# Patient Record
Sex: FEMALE | Race: BLACK OR AFRICAN AMERICAN | NOT HISPANIC OR LATINO | Employment: FULL TIME | RURAL
[De-identification: names, ages, dates, MRNs, and addresses within clinical notes are randomized per-mention and may not be internally consistent; named-entity substitution may affect disease eponyms.]

---

## 2021-11-12 ENCOUNTER — HOSPITAL ENCOUNTER (EMERGENCY)
Facility: HOSPITAL | Age: 46
Discharge: HOME | End: 2021-11-12

## 2021-11-12 VITALS
OXYGEN SATURATION: 99 % | RESPIRATION RATE: 18 BRPM | HEART RATE: 91 BPM | SYSTOLIC BLOOD PRESSURE: 189 MMHG | HEIGHT: 62 IN | BODY MASS INDEX: 35.7 KG/M2 | WEIGHT: 194 LBS | TEMPERATURE: 99 F | DIASTOLIC BLOOD PRESSURE: 107 MMHG

## 2021-11-12 DIAGNOSIS — I10 HYPERTENSION, UNSPECIFIED TYPE: Primary | ICD-10-CM

## 2021-11-12 PROCEDURE — 99283 EMERGENCY DEPT VISIT LOW MDM: CPT | Mod: ,,, | Performed by: NURSE PRACTITIONER

## 2021-11-12 PROCEDURE — 99283 PR EMERGENCY DEPT VISIT,LEVEL III: ICD-10-PCS | Mod: ,,, | Performed by: NURSE PRACTITIONER

## 2021-11-12 PROCEDURE — 99281 EMR DPT VST MAYX REQ PHY/QHP: CPT

## 2021-11-12 RX ORDER — LISINOPRIL 20 MG/1
20 TABLET ORAL DAILY
COMMUNITY
End: 2023-12-01 | Stop reason: CLARIF

## 2021-11-13 ENCOUNTER — TELEPHONE (OUTPATIENT)
Dept: EMERGENCY MEDICINE | Facility: HOSPITAL | Age: 46
End: 2021-11-13

## 2022-01-10 ENCOUNTER — HOSPITAL ENCOUNTER (EMERGENCY)
Facility: HOSPITAL | Age: 47
Discharge: HOME | End: 2022-01-11
Attending: EMERGENCY MEDICINE

## 2022-01-10 DIAGNOSIS — S12.9XXA CLOSED FRACTURE OF TRANSVERSE PROCESS OF CERVICAL VERTEBRA, INITIAL ENCOUNTER: Primary | ICD-10-CM
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
## 2022-01-10 LAB
ALBUMIN SERPL BCP-MCNC: 2.7 G/DL (ref 3.5–5)
ALBUMIN/GLOB SERPL: 0.7 {RATIO}
ALP SERPL-CCNC: 102 U/L (ref 39–100)
ALT SERPL W P-5'-P-CCNC: 24 U/L (ref 13–56)
AMPHET UR QL SCN: NEGATIVE
ANION GAP SERPL CALCULATED.3IONS-SCNC: 10 MMOL/L (ref 7–16)
APTT PPP: 35.9 SECONDS (ref 25.2–37.3)
AST SERPL W P-5'-P-CCNC: 29 U/L (ref 15–37)
BACTERIA #/AREA URNS HPF: ABNORMAL /HPF
BARBITURATES UR QL SCN: NEGATIVE
BASOPHILS # BLD AUTO: 0.03 K/UL (ref 0–0.2)
BASOPHILS NFR BLD AUTO: 0.2 % (ref 0–1)
BENZODIAZ METAB UR QL SCN: NEGATIVE
BILIRUB SERPL-MCNC: 0.3 MG/DL (ref 0–1.2)
BILIRUB UR QL STRIP: NEGATIVE
BUN SERPL-MCNC: 12 MG/DL (ref 7–18)
BUN/CREAT SERPL: 3 (ref 6–20)
CALCIUM SERPL-MCNC: 7.6 MG/DL (ref 8.5–10.1)
CANNABINOIDS UR QL SCN: NEGATIVE
CHLORIDE SERPL-SCNC: 100 MMOL/L (ref 98–107)
CLARITY UR: CLEAR
CO2 SERPL-SCNC: 32 MMOL/L (ref 21–32)
COCAINE UR QL SCN: NEGATIVE
COLOR UR: ABNORMAL
CREAT SERPL-MCNC: 3.61 MG/DL (ref 0.55–1.02)
DIFFERENTIAL METHOD BLD: ABNORMAL
EOSINOPHIL # BLD AUTO: 0.12 K/UL (ref 0–0.5)
EOSINOPHIL NFR BLD AUTO: 0.6 % (ref 1–4)
ERYTHROCYTE [DISTWIDTH] IN BLOOD BY AUTOMATED COUNT: 13.2 % (ref 11.5–14.5)
GLOBULIN SER-MCNC: 3.8 G/DL (ref 2–4)
GLUCOSE SERPL-MCNC: 103 MG/DL (ref 70–105)
GLUCOSE SERPL-MCNC: 111 MG/DL (ref 74–106)
GLUCOSE SERPL-MCNC: 122 MG/DL (ref 70–105)
GLUCOSE SERPL-MCNC: 53 MG/DL (ref 70–105)
GLUCOSE UR STRIP-MCNC: NEGATIVE MG/DL
HCT VFR BLD AUTO: 24.2 % (ref 38–47)
HGB BLD-MCNC: 8.1 G/DL (ref 12–16)
IMM GRANULOCYTES # BLD AUTO: 0.14 K/UL (ref 0–0.04)
IMM GRANULOCYTES NFR BLD: 0.8 % (ref 0–0.4)
INDIRECT COOMBS: NORMAL
INR BLD: 0.97 (ref 0.9–1.1)
KETONES UR STRIP-SCNC: NEGATIVE MG/DL
LEUKOCYTE ESTERASE UR QL STRIP: NEGATIVE
LYMPHOCYTES # BLD AUTO: 1.67 K/UL (ref 1–4.8)
LYMPHOCYTES NFR BLD AUTO: 9 % (ref 27–41)
MCH RBC QN AUTO: 28.4 PG (ref 27–31)
MCHC RBC AUTO-ENTMCNC: 33.5 G/DL (ref 32–36)
MCV RBC AUTO: 84.9 FL (ref 80–96)
MONOCYTES # BLD AUTO: 0.75 K/UL (ref 0–0.8)
MONOCYTES NFR BLD AUTO: 4 % (ref 2–6)
MPC BLD CALC-MCNC: 11 FL (ref 9.4–12.4)
MUCOUS THREADS #/AREA URNS HPF: ABNORMAL /HPF
NEUTROPHILS # BLD AUTO: 15.9 K/UL (ref 1.8–7.7)
NEUTROPHILS NFR BLD AUTO: 85.4 % (ref 53–65)
NITRITE UR QL STRIP: NEGATIVE
NRBC # BLD AUTO: 0 X10E3/UL
NRBC, AUTO (.00): 0 %
OPIATES UR QL SCN: NEGATIVE
PCP UR QL SCN: NEGATIVE
PH UR STRIP: 8 PH UNITS
PLATELET # BLD AUTO: 209 K/UL (ref 150–400)
POTASSIUM SERPL-SCNC: 2.9 MMOL/L (ref 3.5–5.1)
PROT SERPL-MCNC: 6.5 G/DL (ref 6.4–8.2)
PROT UR QL STRIP: >=300
PROTHROMBIN TIME: 12.9 SECONDS (ref 11.7–14.7)
RBC # BLD AUTO: 2.85 M/UL (ref 4.2–5.4)
RBC # UR STRIP: NEGATIVE /UL
RBC #/AREA URNS HPF: ABNORMAL /HPF
RH BLD: NORMAL
SARS-COV-2 RDRP RESP QL NAA+PROBE: NEGATIVE
SODIUM SERPL-SCNC: 139 MMOL/L (ref 136–145)
SP GR UR STRIP: 1.01
SQUAMOUS #/AREA URNS LPF: ABNORMAL /LPF
UROBILINOGEN UR STRIP-ACNC: 0.2 MG/DL
WBC # BLD AUTO: 18.61 K/UL (ref 4.5–11)
WBC #/AREA URNS HPF: ABNORMAL /HPF

## 2022-01-10 PROCEDURE — 80307 DRUG TEST PRSMV CHEM ANLYZR: CPT | Performed by: NURSE PRACTITIONER

## 2022-01-10 PROCEDURE — 82962 GLUCOSE BLOOD TEST: CPT

## 2022-01-10 PROCEDURE — 99284 EMERGENCY DEPT VISIT MOD MDM: CPT | Mod: ,,, | Performed by: NURSE PRACTITIONER

## 2022-01-10 PROCEDURE — 96375 TX/PRO/DX INJ NEW DRUG ADDON: CPT

## 2022-01-10 PROCEDURE — 63600175 PHARM REV CODE 636 W HCPCS: Performed by: NURSE PRACTITIONER

## 2022-01-10 PROCEDURE — 99284 PR EMERGENCY DEPT VISIT,LEVEL IV: ICD-10-PCS | Mod: ,,, | Performed by: NURSE PRACTITIONER

## 2022-01-10 PROCEDURE — 36415 COLL VENOUS BLD VENIPUNCTURE: CPT | Performed by: EMERGENCY MEDICINE

## 2022-01-10 PROCEDURE — 96361 HYDRATE IV INFUSION ADD-ON: CPT

## 2022-01-10 PROCEDURE — 86850 RBC ANTIBODY SCREEN: CPT | Performed by: EMERGENCY MEDICINE

## 2022-01-10 PROCEDURE — 80053 COMPREHEN METABOLIC PANEL: CPT | Performed by: NURSE PRACTITIONER

## 2022-01-10 PROCEDURE — 63600175 PHARM REV CODE 636 W HCPCS

## 2022-01-10 PROCEDURE — 81001 URINALYSIS AUTO W/SCOPE: CPT | Performed by: NURSE PRACTITIONER

## 2022-01-10 PROCEDURE — 85610 PROTHROMBIN TIME: CPT | Performed by: EMERGENCY MEDICINE

## 2022-01-10 PROCEDURE — 25000003 PHARM REV CODE 250: Performed by: NURSE PRACTITIONER

## 2022-01-10 PROCEDURE — 85025 COMPLETE CBC W/AUTO DIFF WBC: CPT | Performed by: NURSE PRACTITIONER

## 2022-01-10 PROCEDURE — S5010 5% DEXTROSE AND 0.45% SALINE: HCPCS | Performed by: NURSE PRACTITIONER

## 2022-01-10 PROCEDURE — 87635 SARS-COV-2 COVID-19 AMP PRB: CPT | Performed by: EMERGENCY MEDICINE

## 2022-01-10 PROCEDURE — 96374 THER/PROPH/DIAG INJ IV PUSH: CPT

## 2022-01-10 PROCEDURE — 99285 EMERGENCY DEPT VISIT HI MDM: CPT | Mod: 25

## 2022-01-10 PROCEDURE — 86923 COMPATIBILITY TEST ELECTRIC: CPT | Performed by: EMERGENCY MEDICINE

## 2022-01-10 PROCEDURE — 25000003 PHARM REV CODE 250

## 2022-01-10 PROCEDURE — 85730 THROMBOPLASTIN TIME PARTIAL: CPT | Performed by: EMERGENCY MEDICINE

## 2022-01-10 RX ORDER — ONDANSETRON 2 MG/ML
INJECTION INTRAMUSCULAR; INTRAVENOUS
Status: COMPLETED
Start: 2022-01-10 | End: 2022-01-10

## 2022-01-10 RX ORDER — DEXTROSE MONOHYDRATE AND SODIUM CHLORIDE 5; .45 G/100ML; G/100ML
INJECTION, SOLUTION INTRAVENOUS CONTINUOUS
Status: DISCONTINUED | OUTPATIENT
Start: 2022-01-10 | End: 2022-01-11 | Stop reason: HOSPADM

## 2022-01-10 RX ORDER — MORPHINE SULFATE 4 MG/ML
4 INJECTION, SOLUTION INTRAMUSCULAR; INTRAVENOUS
Status: COMPLETED | OUTPATIENT
Start: 2022-01-10 | End: 2022-01-10

## 2022-01-10 RX ORDER — ONDANSETRON 2 MG/ML
4 INJECTION INTRAMUSCULAR; INTRAVENOUS
Status: COMPLETED | OUTPATIENT
Start: 2022-01-10 | End: 2022-01-10

## 2022-01-10 RX ADMIN — MORPHINE SULFATE 4 MG: 4 INJECTION INTRAVENOUS at 09:01

## 2022-01-10 RX ADMIN — DEXTROSE MONOHYDRATE 25 G: 500 INJECTION PARENTERAL at 06:01

## 2022-01-10 RX ADMIN — ONDANSETRON: 2 INJECTION INTRAMUSCULAR; INTRAVENOUS at 07:01

## 2022-01-10 RX ADMIN — DEXTROSE AND SODIUM CHLORIDE: 5; 450 INJECTION, SOLUTION INTRAVENOUS at 08:01
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
## 2022-01-11 VITALS
TEMPERATURE: 99 F | HEIGHT: 62 IN | SYSTOLIC BLOOD PRESSURE: 140 MMHG | OXYGEN SATURATION: 97 % | RESPIRATION RATE: 18 BRPM | BODY MASS INDEX: 35.7 KG/M2 | HEART RATE: 82 BPM | DIASTOLIC BLOOD PRESSURE: 81 MMHG | WEIGHT: 194 LBS

## 2022-01-11 LAB
ABO + RH BLD: NORMAL
ALBUMIN SERPL BCP-MCNC: 2.4 G/DL (ref 3.5–5)
ALBUMIN/GLOB SERPL: 0.7 {RATIO}
ALP SERPL-CCNC: 85 U/L (ref 39–100)
ALT SERPL W P-5'-P-CCNC: 22 U/L (ref 13–56)
ANION GAP SERPL CALCULATED.3IONS-SCNC: 11 MMOL/L (ref 7–16)
AST SERPL W P-5'-P-CCNC: 27 U/L (ref 15–37)
BILIRUB SERPL-MCNC: 0.4 MG/DL (ref 0–1.2)
BLD PROD TYP BPU: NORMAL
BLOOD UNIT EXPIRATION DATE: NORMAL
BLOOD UNIT TYPE CODE: 5100
BUN SERPL-MCNC: 15 MG/DL (ref 7–18)
BUN/CREAT SERPL: 3 (ref 6–20)
CALCIUM SERPL-MCNC: 7.2 MG/DL (ref 8.5–10.1)
CHLORIDE SERPL-SCNC: 97 MMOL/L (ref 98–107)
CO2 SERPL-SCNC: 31 MMOL/L (ref 21–32)
CREAT SERPL-MCNC: 4.39 MG/DL (ref 0.55–1.02)
CROSSMATCH INTERPRETATION: NORMAL
DISPENSE STATUS: NORMAL
GLOBULIN SER-MCNC: 3.6 G/DL (ref 2–4)
GLUCOSE SERPL-MCNC: 111 MG/DL (ref 74–106)
GLUCOSE SERPL-MCNC: 86 MG/DL (ref 70–105)
HCT VFR BLD AUTO: 21.4 % (ref 38–47)
HCT VFR BLD AUTO: 23.6 % (ref 38–47)
HGB BLD-MCNC: 6.8 G/DL (ref 12–16)
HGB BLD-MCNC: 8 G/DL (ref 12–16)
POTASSIUM SERPL-SCNC: 3.5 MMOL/L (ref 3.5–5.1)
PROT SERPL-MCNC: 6 G/DL (ref 6.4–8.2)
SODIUM SERPL-SCNC: 135 MMOL/L (ref 136–145)
UNIT NUMBER: NORMAL

## 2022-01-11 PROCEDURE — 99285 EMERGENCY DEPT VISIT HI MDM: CPT | Mod: ,,, | Performed by: SURGERY

## 2022-01-11 PROCEDURE — 82962 GLUCOSE BLOOD TEST: CPT

## 2022-01-11 PROCEDURE — 36415 COLL VENOUS BLD VENIPUNCTURE: CPT | Performed by: EMERGENCY MEDICINE

## 2022-01-11 PROCEDURE — 25000003 PHARM REV CODE 250: Performed by: EMERGENCY MEDICINE

## 2022-01-11 PROCEDURE — 85014 HEMATOCRIT: CPT | Performed by: EMERGENCY MEDICINE

## 2022-01-11 PROCEDURE — 25500020 PHARM REV CODE 255: Performed by: EMERGENCY MEDICINE

## 2022-01-11 PROCEDURE — 99285 PR EMERGENCY DEPT VISIT,LEVEL V: ICD-10-PCS | Mod: ,,, | Performed by: SURGERY

## 2022-01-11 PROCEDURE — 36430 TRANSFUSION BLD/BLD COMPNT: CPT

## 2022-01-11 PROCEDURE — P9016 RBC LEUKOCYTES REDUCED: HCPCS | Performed by: EMERGENCY MEDICINE

## 2022-01-11 PROCEDURE — 80053 COMPREHEN METABOLIC PANEL: CPT | Performed by: EMERGENCY MEDICINE

## 2022-01-11 RX ORDER — HYDROCODONE BITARTRATE AND ACETAMINOPHEN 10; 325 MG/1; MG/1
1 TABLET ORAL EVERY 6 HOURS PRN
Qty: 16 TABLET | Refills: 0 | Status: SHIPPED | OUTPATIENT
Start: 2022-01-11 | End: 2023-12-01 | Stop reason: CLARIF

## 2022-01-11 RX ORDER — HYDROCODONE BITARTRATE AND ACETAMINOPHEN 500; 5 MG/1; MG/1
TABLET ORAL
Status: DISCONTINUED | OUTPATIENT
Start: 2022-01-11 | End: 2022-01-11 | Stop reason: HOSPADM

## 2022-01-11 RX ADMIN — IOPAMIDOL 100 ML: 755 INJECTION, SOLUTION INTRAVENOUS at 01:01

## 2022-01-11 RX ADMIN — SODIUM CHLORIDE: 9 INJECTION, SOLUTION INTRAVENOUS at 02:01

## 2022-01-11 NOTE — ED PROVIDER NOTES
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 needed for Pain. 16 tablet 1/11/2022  Ren Edwards MD        Follow-up Information    None          Kellen Gonzalez, RADHA  01/11/22 0107

## 2022-01-11 NOTE — DISCHARGE INSTRUCTIONS
Where neck brace as needed for pain.  Follow up in clinic with primary care provider in 2-3 days for recheck.  Return to emergency department for any worsening or further problems.
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
## 2023-12-01 ENCOUNTER — HOSPITAL ENCOUNTER (EMERGENCY)
Facility: HOSPITAL | Age: 48
Discharge: HOME | End: 2023-12-01
Payer: COMMERCIAL

## 2023-12-01 VITALS
DIASTOLIC BLOOD PRESSURE: 83 MMHG | WEIGHT: 193 LBS | HEART RATE: 94 BPM | TEMPERATURE: 99 F | RESPIRATION RATE: 18 BRPM | BODY MASS INDEX: 35.51 KG/M2 | OXYGEN SATURATION: 96 % | SYSTOLIC BLOOD PRESSURE: 118 MMHG | HEIGHT: 62 IN

## 2023-12-01 DIAGNOSIS — R05.9 COUGH: ICD-10-CM

## 2023-12-01 DIAGNOSIS — J40 BRONCHITIS: Primary | ICD-10-CM

## 2023-12-01 DIAGNOSIS — M94.0 COSTOCHONDRITIS: ICD-10-CM

## 2023-12-01 PROCEDURE — 99284 EMERGENCY DEPT VISIT MOD MDM: CPT | Mod: 25

## 2023-12-01 PROCEDURE — 99284 EMERGENCY DEPT VISIT MOD MDM: CPT | Mod: GF,EDII,,

## 2023-12-01 PROCEDURE — 63600175 PHARM REV CODE 636 W HCPCS

## 2023-12-01 PROCEDURE — 99284 PR EMERGENCY DEPT VISIT,LEVEL IV: ICD-10-PCS | Mod: GF,EDII,,

## 2023-12-01 PROCEDURE — 96372 THER/PROPH/DIAG INJ SC/IM: CPT

## 2023-12-01 RX ORDER — CEFTRIAXONE 1 G/1
1 INJECTION, POWDER, FOR SOLUTION INTRAMUSCULAR; INTRAVENOUS
Status: COMPLETED | OUTPATIENT
Start: 2023-12-01 | End: 2023-12-01

## 2023-12-01 RX ORDER — ORPHENADRINE CITRATE 30 MG/ML
30 INJECTION INTRAMUSCULAR; INTRAVENOUS
Status: COMPLETED | OUTPATIENT
Start: 2023-12-01 | End: 2023-12-01

## 2023-12-01 RX ORDER — CARVEDILOL 12.5 MG/1
12.5 TABLET ORAL 2 TIMES DAILY
COMMUNITY
Start: 2023-08-11

## 2023-12-01 RX ORDER — PANTOPRAZOLE SODIUM 40 MG/1
40 TABLET, DELAYED RELEASE ORAL
COMMUNITY
Start: 2023-07-13

## 2023-12-01 RX ORDER — INSULIN GLARGINE 100 [IU]/ML
40 INJECTION, SOLUTION SUBCUTANEOUS DAILY
COMMUNITY
Start: 2023-11-17

## 2023-12-01 RX ORDER — AZITHROMYCIN 250 MG/1
TABLET, FILM COATED ORAL
Qty: 6 TABLET | Refills: 0 | Status: SHIPPED | OUTPATIENT
Start: 2023-12-01 | End: 2023-12-06

## 2023-12-01 RX ADMIN — ORPHENADRINE CITRATE 30 MG: 60 INJECTION INTRAMUSCULAR; INTRAVENOUS at 05:12

## 2023-12-01 RX ADMIN — CEFTRIAXONE SODIUM 1 G: 1 INJECTION, POWDER, FOR SOLUTION INTRAMUSCULAR; INTRAVENOUS at 06:12

## 2023-12-01 NOTE — ED PROVIDER NOTES
Encounter Date: 12/1/2023       History     Chief Complaint   Patient presents with    Flank Pain     Left side pain x 2 weeks (edema)     Patient is a 48-year-old black female who presents to the emergency depart with complaints of cough x3 weeks and left-sided rib pain.  She reports that the rib pain started approximately 1 week after the cough.  She states that it is a stinging sensation and feels like it is right under her skin.  She is been treating at home with Tylenol and reports minimal relief.  She denies any known injury.  She denies any known fever.  She reports that the cough has been productive and that she is producing yellow sputum.  She denies any chest pain, shortness of breath, weakness, numbness, syncope, or any other complaints.  She does have known chronic kidney disease and is on dialysis.  She also reports a history of hypertension and diabetes.  Her blood pressure is 130/91, heart rate is 111, temperature is 99.3°, oxygen saturation 95% on room air respirations are 18.  She appears in no acute distress.    The history is provided by the patient.     Review of patient's allergies indicates:  No Known Allergies  Past Medical History:   Diagnosis Date    Chronic kidney disease     Diabetes mellitus     Hypertension      Past Surgical History:   Procedure Laterality Date    ABDOMINAL SURGERY      colon cancer    HYSTERECTOMY       History reviewed. No pertinent family history.  Social History     Tobacco Use    Smoking status: Never    Smokeless tobacco: Never   Substance Use Topics    Alcohol use: Never    Drug use: Never     Review of Systems   Constitutional:  Negative for activity change, appetite change, chills and fever.   HENT:  Positive for congestion, ear pain (left) and sore throat. Negative for ear discharge and trouble swallowing.    Respiratory:  Positive for cough. Negative for shortness of breath.    Cardiovascular:  Negative for chest pain, palpitations and leg swelling.    Gastrointestinal:  Negative for abdominal pain, diarrhea, nausea and vomiting.   Genitourinary:  Negative for dysuria and frequency.   Musculoskeletal:  Positive for myalgias. Negative for back pain, gait problem, neck pain and neck stiffness.   Skin:  Negative for color change, pallor and rash.   Neurological:  Negative for dizziness, syncope, facial asymmetry, speech difficulty, weakness, light-headedness and headaches.   Hematological:  Does not bruise/bleed easily.   Psychiatric/Behavioral:  Negative for confusion. The patient is not nervous/anxious.    All other systems reviewed and are negative.      Physical Exam     Initial Vitals [12/01/23 1654]   BP Pulse Resp Temp SpO2   (!) 130/91 (!) 111 18 99.3 °F (37.4 °C) 95 %      MAP       --         Physical Exam    Nursing note and vitals reviewed.  Constitutional: She appears well-developed and well-nourished. No distress.   HENT:   Head: Normocephalic and atraumatic.   Right Ear: Tympanic membrane and ear canal normal.   Left Ear: Ear canal normal. Tympanic membrane is injected (cloudy).   Mouth/Throat: Uvula is midline and mucous membranes are normal. Posterior oropharyngeal erythema present.   Eyes: Conjunctivae and EOM are normal. Pupils are equal, round, and reactive to light.   Neck: Neck supple.   Normal range of motion.  Cardiovascular:  Regular rhythm and normal heart sounds.   Tachycardia present.         Pulmonary/Chest: Breath sounds normal. No respiratory distress. She has no wheezes. She has no rhonchi. She has no rales. She exhibits tenderness (tender over the left rib).   Abdominal: Abdomen is soft. Bowel sounds are normal. She exhibits no distension. There is no abdominal tenderness. There is no rebound and no guarding.   Musculoskeletal:         General: Normal range of motion.      Cervical back: Normal range of motion and neck supple.     Lymphadenopathy:     She has cervical adenopathy.   Neurological: She is alert and oriented to person,  place, and time. She has normal strength. GCS score is 15. GCS eye subscore is 4. GCS verbal subscore is 5. GCS motor subscore is 6.   Skin: Skin is warm and dry. Capillary refill takes less than 2 seconds.   Psychiatric: She has a normal mood and affect. Her behavior is normal. Judgment and thought content normal.         Medical Screening Exam   See Full Note    ED Course   Procedures  Labs Reviewed - No data to display       Imaging Results              X-Ray Chest PA And Lateral (Final result)  Result time 12/01/23 18:07:34      Final result by Panchito Stephen DO (12/01/23 18:07:34)                   Impression:      No acute cardiopulmonary process demonstrated.    Point of Service: [facility]      Electronically signed by: Panchito Stephen  Date:    12/01/2023  Time:    18:07               Narrative:    EXAMINATION:  XR CHEST PA AND LATERAL    CLINICAL HISTORY:  Cough, unspecified    COMPARISON:  Chest x-ray January 10, 2022    TECHNIQUE:  Frontal and lateral views of the chest.    FINDINGS:  Heart size appears within normal limits.  No focal consolidation, pleural effusion, or pneumothorax.  Visualized osseous and surrounding soft tissue structures demonstrate no acute abnormality.                                       Medications   cefTRIAXone injection 1 g (has no administration in time range)   orphenadrine injection 30 mg (30 mg Intramuscular Given 12/1/23 1725)     Medical Decision Making  Patient presents for left-sided rib pain and cough evaluation.  She denies any injury.  She does report a productive cough and is receiving yellow sputum.  She is afebrile.  She denies any shortness of breath.  She is been treating with Tylenol with minimal relief.  She is currently on dialysis.  She reports that she was seen earlier today at [facility] and received a chest x-ray.  She reports they do not tell her the results and told someone would call her Monday.  She also reports  that she did not receive any clear discharge instructions.  We will perform a chest x-ray for further evaluation.  Due to the patient's chronic kidney condition we will not provide any Toradol or steroids at this time.  This was explained to the patient.  She was advised to discuss the possibility of these treatment options with her nephrologist and follow his instructions accordingly.  However, we will treat the patient with Norflex 30 mg IM while in the emergency department for pain control.    18:30 in to re-evaluate.  Patient reports mild improvement after Norflex IM.  Chest x-ray was noted to show no acute cardiopulmonary process.  We will still treat the patient's three-week history of cough has a bronchitis.  She reports that it is not improving.  We will dose her with Rocephin 1 g IM in the emergency department and follow with an outpatient prescription for azithromycin.  She was encouraged to continue Tylenol over-the-counter as directed.  She was instructed to consult with her nephrologist about their comfort level with NSAID therapy.  She was advised to withhold any NSAIDs unless instructed otherwise by them.  She was also instructed to brace the area of discomfort when coughing, deep breathing, sneezing, or any other anticipated discomfort.  She was advised to follow up with her regular doctor on Monday for a recheck.  She was also advised to return to the emergency department for any new or worrisome symptoms.  She verbalizes understanding and is in agreement with this plan.    Amount and/or Complexity of Data Reviewed  Independent Historian:      Details: Patient is a 48-year-old black female who presents to the emergency depart with complaints of cough x3 weeks and left-sided rib pain.  She reports that the rib pain started approximately 1 week after the cough.  She states that it is a stinging sensation and feels like it is right under her skin.  She is been treating at home with Tylenol and reports  minimal relief.  She denies any known injury.  She denies any known fever.  She reports that the cough has been productive and that she is producing yellow sputum.  She denies any chest pain, shortness of breath, weakness, numbness, syncope, or any other complaints.  She does have known chronic kidney disease and is on dialysis.  She also reports a history of hypertension and diabetes.  Her blood pressure is 130/91, heart rate is 111, temperature is 99.3°, oxygen saturation 95% on room air respirations are 18.  She appears in no acute distress.  Radiology: ordered.     Details: Chest x-ray shows no acute cardiopulmonary process.    Risk  Prescription drug management.  Risk Details: The presentation of Umu Caputo is consistent with acute bronchitis. More serious diseases of the lower respiratory tract were considered but in the absence of clinical or ancillary findings highly suggestive of such, these conditions were considered unlikely. Such diseases include but are not limited to pneumonia, asthma (extrinsic or intrinsic), influenza, retained foreign body, or occupational exposures. Other causes of cough such as GERD, pharyngitis, sinusitis, or COPD were also felt to be unlikely.    Upon discharge, Umu Caputo has no evidence of respiratory failure and is comfortable without respiratory distress. Additionally, Umu Caputo has no evidence of airway compromise and is speaking in full/complete sentences without difficulty. Umu Caputo meets outpatient treatment criteria.    Data Reviewed/Counseling: I have reviewed the patient's vital signs, nursing notes, and other relevant ancillary testing/information. I have had a detailed discussion with the patient regarding the historical points, examination findings, and any diagnostic results. I have also discussed the need for outpatient follow-up. I have recommended symptomatic therapy directed at alleviating symptoms as care for acute bronchitis is primarily  supportive.  The patient's cough has been present for 3 weeks and she also has comorbidities such as chronic kidney disease, hypertension, and diabetes so we will treat the patient with antibiotic therapy.  See MDM above for further details.    Umu Caputo has been counseled to return to the Emergency Department if symptoms worsen or if there are any questions or concerns that arise while at home.    Umu Caputo encouraged to practice good infection control procedures to include but not limited to frequent hand washing to lessen likelihood of transmission of this infection.   Final diagnoses:  [R05.9] Cough  [J40] Bronchitis (Primary)  [M94.0] Costochondritis                                      Clinical Impression:   Final diagnoses:  [R05.9] Cough  [J40] Bronchitis (Primary)  [M94.0] Costochondritis        ED Disposition Condition    Discharge Stable          ED Prescriptions       Medication Sig Dispense Start Date End Date Auth. Provider    azithromycin (Z-MAGGIE) 250 MG tablet Take 2 tablets by mouth on day 1; Take 1 tablet by mouth on days 2-5 6 tablet 12/1/2023 12/6/2023 Saad Alcantar FNP          Follow-up Information       Follow up With Specialties Details Why Contact Info    Sandrine Sesay MD Family Medicine Go on 12/4/2023  [address]  [facility] [place] [address]  [phone]               Saad Alcantar FNP  12/01/23 8874

## 2023-12-02 NOTE — DISCHARGE INSTRUCTIONS
Splint affected area during coughing, sneezing, and deep breathing as discussed.  Take antibiotics as prescribed.  Complete all antibiotics even if you feel better.  Please contact your nephrologist for there outpatient recommendations on anti-inflammatories and cough medications.  Follow up with your primary care doctor on Monday as planned.  Return to the emergency department for any new or worrisome symptoms.

## 2023-12-06 NOTE — ADDENDUM NOTE
Encounter addended by: Devika Márquez on: 12/6/2023 9:55 AM   Actions taken: Charge Capture section accepted

## 2024-09-09 ENCOUNTER — HOSPITAL ENCOUNTER (EMERGENCY)
Facility: HOSPITAL | Age: 49
Discharge: HOME | End: 2024-09-09
Payer: COMMERCIAL

## 2024-09-09 VITALS
TEMPERATURE: 98 F | DIASTOLIC BLOOD PRESSURE: 89 MMHG | HEIGHT: 62 IN | BODY MASS INDEX: 32.39 KG/M2 | WEIGHT: 176 LBS | HEART RATE: 77 BPM | SYSTOLIC BLOOD PRESSURE: 143 MMHG | RESPIRATION RATE: 16 BRPM | OXYGEN SATURATION: 99 %

## 2024-09-09 DIAGNOSIS — M25.561 KNEE PAIN, RIGHT: ICD-10-CM

## 2024-09-09 DIAGNOSIS — M25.461 EFFUSION OF RIGHT KNEE JOINT: ICD-10-CM

## 2024-09-09 DIAGNOSIS — S89.91XA RIGHT KNEE INJURY, INITIAL ENCOUNTER: Primary | ICD-10-CM
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
## 2024-09-09 DIAGNOSIS — S86.911A KNEE STRAIN, RIGHT, INITIAL ENCOUNTER: ICD-10-CM

## 2024-09-09 PROCEDURE — 99284 EMERGENCY DEPT VISIT MOD MDM: CPT | Mod: EDII,,,

## 2024-09-09 PROCEDURE — 25000003 PHARM REV CODE 250

## 2024-09-09 PROCEDURE — 99283 EMERGENCY DEPT VISIT LOW MDM: CPT | Mod: 25

## 2024-09-09 PROCEDURE — 29505 APPLICATION LONG LEG SPLINT: CPT | Mod: RT

## 2024-09-09 RX ORDER — LEVOTHYROXINE SODIUM 50 UG/1
50 TABLET ORAL
COMMUNITY
Start: 2024-09-06

## 2024-09-09 RX ORDER — HYDROCODONE BITARTRATE AND ACETAMINOPHEN 5; 325 MG/1; MG/1
1 TABLET ORAL EVERY 6 HOURS PRN
Qty: 12 TABLET | Refills: 0 | Status: SHIPPED | OUTPATIENT
Start: 2024-09-09

## 2024-09-09 RX ORDER — SEVELAMER CARBONATE 800 MG/1
TABLET, FILM COATED ORAL
COMMUNITY
Start: 2024-07-02

## 2024-09-09 RX ORDER — HYDROCODONE BITARTRATE AND ACETAMINOPHEN 5; 325 MG/1; MG/1
1 TABLET ORAL
Status: COMPLETED | OUTPATIENT
Start: 2024-09-09 | End: 2024-09-09

## 2024-09-09 RX ADMIN — HYDROCODONE BITARTRATE AND ACETAMINOPHEN 1 TABLET: 5; 325 TABLET ORAL at 11:09

## 2024-09-09 NOTE — DISCHARGE INSTRUCTIONS
Take hydrocodone as prescribed for pain control.  Use knee immobilizer and crutches as directed until instructed otherwise.  Rest, ice, compress, and elevate your right lower extremity as directed.  We have placed a outpatient referral for orthopedic follow up and they should call you with the appointment date and time when available.  Follow up with the primary care provider in 1-2 days for a recheck.  Return to emerge department for any new or worrisome symptoms.

## 2024-09-09 NOTE — ED PROVIDER NOTES
Encounter Date: 9/9/2024       History     Chief Complaint   Patient presents with    Knee Injury     Patient comes in with right knee pain and swelling. States she slipped on wet concrete, knee bent backwards, Patient took tylenol at 8 am this morning     Patient is a 49-year-old female who presents to emergency department complaints of right knee pain after a fall yesterday.  She reports that she slipped and landed on her right knee and felt as if her knee pushed upwards.  She denies any head injury, LOC, neck pain, back pain, chest pain, shortness of breath, abdominal pain, weakness, numbness, or any other complaints at this time.  She has been treating with Tylenol most recently at 8:00 a.m. this morning and reports continued pain.  She has been utilizing crutches for ambulation.  She does have chronic kidney disease and is on dialysis but was able to complete a full cycle of dialysis this morning.  Also has a history of hypertension and diabetes and reports compliance with her usual medications.  Blood pressure is 165/98, temperature 97.9°, heart rate 84, respirations 18, and oxygen saturation 100% on room air.  She appears in no immediate distress.    The history is provided by the patient.     Review of patient's allergies indicates:  No Known Allergies  Past Medical History:   Diagnosis Date    Chronic kidney disease     Diabetes mellitus     Hypertension      Past Surgical History:   Procedure Laterality Date    ABDOMINAL SURGERY      colon cancer    HYSTERECTOMY       No family history on file.  Social History     Tobacco Use    Smoking status: Never    Smokeless tobacco: Never   Substance Use Topics    Alcohol use: Never    Drug use: Never     Review of Systems   Constitutional:  Negative for activity change, appetite change, chills and fever.   HENT:  Negative for congestion, sore throat and trouble swallowing.    Eyes: Negative.    Respiratory:  Negative for cough and shortness of breath.     Cardiovascular:  Negative for chest pain and palpitations.   Gastrointestinal:  Negative for abdominal pain, diarrhea, nausea and vomiting.   Endocrine: Negative.    Genitourinary:  Negative for flank pain and frequency.   Musculoskeletal:  Positive for arthralgias (right knee). Negative for back pain, neck pain and neck stiffness.   Skin:  Negative for color change, pallor and rash.   Allergic/Immunologic: Negative.    Neurological:  Negative for dizziness, syncope, speech difficulty, weakness, light-headedness, numbness and headaches.   Hematological:  Does not bruise/bleed easily.   Psychiatric/Behavioral:  Negative for confusion. The patient is not nervous/anxious.    All other systems reviewed and are negative.      Physical Exam     Initial Vitals [09/09/24 1113]   BP Pulse Resp Temp SpO2   (!) 165/98 84 18 97.9 °F (36.6 °C) 100 %      MAP       --         Physical Exam    Nursing note and vitals reviewed.  Constitutional: She appears well-developed and well-nourished. She is not diaphoretic. She is active.  Non-toxic appearance. No distress.   HENT:   Head: Normocephalic and atraumatic.   Right Ear: External ear normal.   Left Ear: External ear normal.   Nose: Nose normal.   Mouth/Throat: Oropharynx is clear and moist.   Eyes: Conjunctivae and EOM are normal. Pupils are equal, round, and reactive to light.   Neck: Neck supple.   Normal range of motion.  Cardiovascular:  Normal rate, regular rhythm, normal heart sounds, intact distal pulses and normal pulses.           Pulmonary/Chest: Effort normal and breath sounds normal. No respiratory distress. She has no wheezes. She has no rhonchi. She has no rales. She exhibits no tenderness.   Abdominal: Abdomen is soft. Bowel sounds are normal. She exhibits no distension. There is no abdominal tenderness. There is no rebound and no guarding.   Musculoskeletal:      Cervical back: Normal range of motion and neck supple.      Right hip: Normal.      Left hip: Normal.       Right knee: Swelling (anterior) present. Decreased range of motion (limited to pain). Normal pulse.      Left knee: Normal.      Comments: Normal pulse, motor, and sensory bilateral lower extremities on exam.     Neurological: She is alert and oriented to person, place, and time. She has normal strength. GCS score is 15. GCS eye subscore is 4. GCS verbal subscore is 5. GCS motor subscore is 6.   Skin: Skin is warm and dry. Capillary refill takes less than 2 seconds.   Psychiatric: She has a normal mood and affect. Her behavior is normal. Judgment and thought content normal.         Medical Screening Exam   See Full Note    ED Course   Procedures  Labs Reviewed - No data to display       Imaging Results              X-Ray Knee 1 or 2 View Right (Final result)  Result time 09/09/24 12:03:18      Final result by Mateus Fang MD (09/09/24 12:03:18)                   Impression:      No convincing evidence of acute fracture or dislocation. Suspect small to moderate suprapatellar knee joint effusion.      Electronically signed by: Mateus Fang  Date:    09/09/2024  Time:    12:03               Narrative:    EXAMINATION:  XR KNEE 1 OR 2 VIEW RIGHT    CLINICAL HISTORY:  Pain in right knee    TECHNIQUE:  AP and lateral views of the right knee were performed.    COMPARISON:  None    FINDINGS:  No convincing evidence of acute fracture or dislocation.  Suspect small to moderate suprapatellar knee joint effusion.    Enthesopathic change in the region of the tibial tubercle.  Atherosclerotic vascular calcifications.                                       Medications   HYDROcodone-acetaminophen 5-325 mg per tablet 1 tablet (1 tablet Oral Given 9/9/24 1139)     Medical Decision Making  Presents for evaluation of right knee pain following a trip and fall yesterday.  She was alert and oriented x4.  GCS 15.  Hypertensive with a blood pressure 165/98 but vitals are within normal limits otherwise.  She does complain of  right knee pain but denies any other injuries or complaints. Head is atraumatic, no spinal tenderness on palpation, abdomen is soft and nontender, pelvic squeeze negative, tenderness and swelling over the anterior portion of the knee surrounding the patella, normal pulse, motor, and sensory in bilateral lower extremities.  We will perform an x-ray of the right knee to rule out any bony abnormality and provide her with Norco 5 mg p.o. for pain control.  She does have her own crutches and has been ambulating with their use.     X-ray of the right knee shows possible small suprapatellar knee joint effusion but no acute fracture or dislocation.  We will place the patient in a knee immobilizer and have her continue crutches and we will also place a referral to Orthopedics for further evaluation and treatment.  We did inform her they would call her with the appointment date and time as soon as available.  We will provide hydrocodone for pain control at home for treatment during the acute phase.  Strict ED return precautions were explained in detail.  All questions were answered.  She verbalizes understanding is in agreement with this plan.    Amount and/or Complexity of Data Reviewed  Independent Historian:      Details: Patient is a 49-year-old female who presents to emergency department complaints of right knee pain after a fall yesterday.  She reports that she slipped and landed on her right knee and felt as if her knee pushed upwards.  She denies any head injury, LOC, neck pain, back pain, chest pain, shortness of breath, abdominal pain, weakness, numbness, or any other complaints at this time.  She has been treating with Tylenol most recently at 8:00 a.m. this morning and reports continued pain.  She has been utilizing crutches for ambulation.  She does have chronic kidney disease and is on dialysis but was able to complete a full cycle of dialysis this morning.  Also has a history of hypertension and diabetes and  reports compliance with her usual medications.  Blood pressure is 165/98, temperature 97.9°, heart rate 84, respirations 18, and oxygen saturation 100% on room air.  She appears in no immediate distress.  Radiology: ordered.     Details: X-ray of the right knee shows:  No convincing evidence of acute fracture or dislocation. Suspect small to moderate suprapatellar knee joint effusion.    Risk  Prescription drug management.  Risk Details: Patient presents for emergent evaluation of acute right knee pain that poses a threat to life and/or bodily function.    Final diagnoses:  [M25.561] Knee pain, right  [S89.91XA] Right knee injury, initial encounter (Primary)  [S86.911A] Knee strain, right, initial encounter  [W19.XXXA] Fall, initial encounter   It was not felt that labs would be beneficial at this time.  I ordered X-rays and personally reviewed them and reviewed the radiologist interpretation.  Xray significant for no acute fracture or dislocation.    Patient was managed in the ED with Norco 5 mg p.o. for pain control and knee immobilizer.  The response to treatment was improved.    Patient was discharged in stable condition.  Detailed return precautions discussed.                                       Clinical Impression:   Final diagnoses:  [M25.561] Knee pain, right  [S89.91XA] Right knee injury, initial encounter (Primary)  [S86.911A] Knee strain, right, initial encounter  [W19.XXXA] Fall, initial encounter  [M25.461] Effusion of right knee joint        ED Disposition Condition    Discharge Stable          ED Prescriptions       Medication Sig Dispense Start Date End Date Auth. Provider    HYDROcodone-acetaminophen (NORCO) 5-325 mg per tablet Take 1 tablet by mouth every 6 (six) hours as needed for Pain. 12 tablet 9/9/2024 -- Saad Alcantar FNP          Follow-up Information       Follow up With Specialties Details Why Contact Info    Ash Thomas MD Orthopedic Surgery  We will call you with the  appointment date and time. [address] [place] [id number]  [phone]               Saad Alcantar, P  09/09/24 1214

## 2024-09-18 ENCOUNTER — TELEPHONE (OUTPATIENT)
Dept: ORTHOPEDICS | Facility: CLINIC | Age: 49
End: 2024-09-18
Payer: COMMERCIAL

## 2024-11-11 ENCOUNTER — HOSPITAL ENCOUNTER (EMERGENCY)
Facility: HOSPITAL | Age: 49
Discharge: HOME | End: 2024-11-11
Payer: COMMERCIAL

## 2024-11-11 VITALS
WEIGHT: 195 LBS | OXYGEN SATURATION: 99 % | HEART RATE: 87 BPM | HEIGHT: 62 IN | TEMPERATURE: 100 F | RESPIRATION RATE: 18 BRPM | BODY MASS INDEX: 35.88 KG/M2 | SYSTOLIC BLOOD PRESSURE: 148 MMHG | DIASTOLIC BLOOD PRESSURE: 81 MMHG

## 2024-11-11 DIAGNOSIS — Z79.4 TYPE 2 DIABETES MELLITUS WITH HYPERGLYCEMIA, WITH LONG-TERM CURRENT USE OF INSULIN: ICD-10-CM

## 2024-11-11 DIAGNOSIS — K80.20 CALCULUS OF GALLBLADDER WITHOUT CHOLECYSTITIS WITHOUT OBSTRUCTION: ICD-10-CM

## 2024-11-11 DIAGNOSIS — R10.11 RUQ PAIN: Primary | ICD-10-CM

## 2024-11-11 DIAGNOSIS — Z99.2 ESRD (END STAGE RENAL DISEASE) ON DIALYSIS: ICD-10-CM

## 2024-11-11 DIAGNOSIS — E11.65 TYPE 2 DIABETES MELLITUS WITH HYPERGLYCEMIA, WITH LONG-TERM CURRENT USE OF INSULIN: ICD-10-CM

## 2024-11-11 DIAGNOSIS — N18.6 ESRD (END STAGE RENAL DISEASE) ON DIALYSIS: ICD-10-CM

## 2024-11-11 LAB
ACETONE SERPL QL SCN: NEGATIVE
ALBUMIN SERPL BCP-MCNC: 2.8 G/DL (ref 3.5–5)
ALBUMIN/GLOB SERPL: 0.7 {RATIO}
ALP SERPL-CCNC: 249 U/L (ref 39–100)
ALT SERPL W P-5'-P-CCNC: 26 U/L (ref 13–56)
AMYLASE SERPL-CCNC: 108 U/L (ref 25–115)
ANION GAP SERPL CALCULATED.3IONS-SCNC: 11 MMOL/L (ref 7–16)
AST SERPL W P-5'-P-CCNC: 17 U/L (ref 15–37)
BACTERIA #/AREA URNS HPF: ABNORMAL /HPF
BASOPHILS # BLD AUTO: 0.04 K/UL (ref 0–0.2)
BASOPHILS NFR BLD AUTO: 0.3 % (ref 0–1)
BILIRUB SERPL-MCNC: 0.3 MG/DL (ref ?–1.2)
BILIRUB UR QL STRIP: NEGATIVE
BUN SERPL-MCNC: 23 MG/DL (ref 7–18)
BUN/CREAT SERPL: 3 (ref 6–20)
CALCIUM SERPL-MCNC: 8.6 MG/DL (ref 8.5–10.1)
CHLORIDE SERPL-SCNC: 89 MMOL/L (ref 98–107)
CLARITY UR: CLEAR
CO2 SERPL-SCNC: 34 MMOL/L (ref 21–32)
COLOR UR: YELLOW
CREAT SERPL-MCNC: 7.31 MG/DL (ref 0.55–1.02)
DIFFERENTIAL METHOD BLD: ABNORMAL
EGFR (NO RACE VARIABLE) (RUSH/TITUS): 6 ML/MIN/1.73M2
EOSINOPHIL # BLD AUTO: 0.1 K/UL (ref 0–0.5)
EOSINOPHIL NFR BLD AUTO: 0.8 % (ref 1–4)
ERYTHROCYTE [DISTWIDTH] IN BLOOD BY AUTOMATED COUNT: 16.3 % (ref 11.5–14.5)
GLOBULIN SER-MCNC: 4.1 G/DL (ref 2–4)
GLUCOSE SERPL-MCNC: 406 MG/DL (ref 70–105)
GLUCOSE SERPL-MCNC: 710 MG/DL (ref 74–106)
GLUCOSE UR STRIP-MCNC: >=1000 MG/DL
HCT VFR BLD AUTO: 30.5 % (ref 38–47)
HGB BLD-MCNC: 9.4 G/DL (ref 12–16)
IMM GRANULOCYTES # BLD AUTO: 0.08 K/UL (ref 0–0.04)
IMM GRANULOCYTES NFR BLD: 0.7 % (ref 0–0.4)
KETONES UR STRIP-SCNC: NEGATIVE MG/DL
LEUKOCYTE ESTERASE UR QL STRIP: NEGATIVE
LIPASE SERPL-CCNC: 99 U/L (ref 16–77)
LYMPHOCYTES # BLD AUTO: 1.55 K/UL (ref 1–4.8)
LYMPHOCYTES NFR BLD AUTO: 13 % (ref 27–41)
MCH RBC QN AUTO: 29.5 PG (ref 27–31)
MCHC RBC AUTO-ENTMCNC: 30.8 G/DL (ref 32–36)
MCV RBC AUTO: 95.6 FL (ref 80–96)
MONOCYTES # BLD AUTO: 0.68 K/UL (ref 0–0.8)
MONOCYTES NFR BLD AUTO: 5.7 % (ref 2–6)
MPC BLD CALC-MCNC: 11.8 FL (ref 9.4–12.4)
NEUTROPHILS # BLD AUTO: 9.5 K/UL (ref 1.8–7.7)
NEUTROPHILS NFR BLD AUTO: 79.5 % (ref 53–65)
NITRITE UR QL STRIP: NEGATIVE
NRBC # BLD AUTO: 0.08 X10E3/UL
NRBC, AUTO (.00): 0.7 %
PH UR STRIP: 7 PH UNITS
PLATELET # BLD AUTO: 177 K/UL (ref 150–400)
POTASSIUM SERPL-SCNC: 4.6 MMOL/L (ref 3.5–5.1)
PROT SERPL-MCNC: 6.9 G/DL (ref 6.4–8.2)
PROT UR QL STRIP: >=300
RBC # BLD AUTO: 3.19 M/UL (ref 4.2–5.4)
RBC # UR STRIP: ABNORMAL /UL
RBC #/AREA URNS HPF: ABNORMAL /HPF
SODIUM SERPL-SCNC: 129 MMOL/L (ref 136–145)
SP GR UR STRIP: 1.01
SQUAMOUS #/AREA URNS LPF: ABNORMAL /LPF
UROBILINOGEN UR STRIP-ACNC: 0.2 MG/DL
WBC # BLD AUTO: 11.95 K/UL (ref 4.5–11)
WBC #/AREA URNS HPF: ABNORMAL /HPF
YEAST #/AREA URNS HPF: ABNORMAL /HPF

## 2024-11-11 PROCEDURE — 99284 EMERGENCY DEPT VISIT MOD MDM: CPT | Mod: 25

## 2024-11-11 PROCEDURE — 63600175 PHARM REV CODE 636 W HCPCS: Performed by: NURSE PRACTITIONER

## 2024-11-11 PROCEDURE — 96372 THER/PROPH/DIAG INJ SC/IM: CPT | Performed by: NURSE PRACTITIONER

## 2024-11-11 PROCEDURE — 82150 ASSAY OF AMYLASE: CPT | Performed by: NURSE PRACTITIONER

## 2024-11-11 PROCEDURE — 82962 GLUCOSE BLOOD TEST: CPT

## 2024-11-11 PROCEDURE — 85025 COMPLETE CBC W/AUTO DIFF WBC: CPT | Performed by: NURSE PRACTITIONER

## 2024-11-11 PROCEDURE — 99284 EMERGENCY DEPT VISIT MOD MDM: CPT | Mod: GF | Performed by: NURSE PRACTITIONER

## 2024-11-11 PROCEDURE — 81003 URINALYSIS AUTO W/O SCOPE: CPT | Performed by: NURSE PRACTITIONER

## 2024-11-11 PROCEDURE — 83690 ASSAY OF LIPASE: CPT | Performed by: NURSE PRACTITIONER

## 2024-11-11 PROCEDURE — 80053 COMPREHEN METABOLIC PANEL: CPT | Performed by: NURSE PRACTITIONER

## 2024-11-11 PROCEDURE — 82009 KETONE BODYS QUAL: CPT | Performed by: NURSE PRACTITIONER

## 2024-11-11 RX ADMIN — HUMAN INSULIN 10 UNITS: 100 INJECTION, SOLUTION SUBCUTANEOUS at 09:11

## 2024-11-12 ENCOUNTER — HOSPITAL ENCOUNTER (OUTPATIENT)
Dept: RADIOLOGY | Facility: HOSPITAL | Age: 49
Discharge: HOME | End: 2024-11-12
Attending: NURSE PRACTITIONER
Payer: COMMERCIAL

## 2024-11-12 DIAGNOSIS — R10.11 RUQ PAIN: ICD-10-CM

## 2024-11-12 DIAGNOSIS — K80.20 CALCULUS OF GALLBLADDER WITHOUT CHOLECYSTITIS WITHOUT OBSTRUCTION: ICD-10-CM

## 2024-11-12 PROCEDURE — 76700 US EXAM ABDOM COMPLETE: CPT | Mod: TC

## 2024-11-12 PROCEDURE — 76700 US EXAM ABDOM COMPLETE: CPT | Mod: 26,,, | Performed by: RADIOLOGY

## 2024-11-12 NOTE — ED TRIAGE NOTES
Presents to ER with c/o pain under right breast to the back. States has been having this  problem for about 2 months. Her dr wants her to have a ultrasound but is unable to get one until January. Describes pain as a burning pain. Pain seems to be getting worse. Fell on the right side about 2 months ago.

## 2024-11-12 NOTE — DISCHARGE INSTRUCTIONS
Maintain a diabetic diet with no fried or greasy foods since you have sludge in your gall bladder. I have ordered an outpatient ultrasound for your tomorrow. Radiology should call you with an appointment. I have also sent a referral to Dr. Sykes, General Surgery. His office staff should contact you with an appointment as well. If you have not heard from his office by Wednesday at lunchtime, call this ED. In the meantime, if your pain worsens or you begin to have new symptoms, return to the ED.

## 2024-11-12 NOTE — ED PROVIDER NOTES
Encounter Date: 11/11/2024       History     Chief Complaint   Patient presents with    Abdominal Pain     Presented with c/o RUQ pain radiating around to back and right shoulder that has ongoing for the last 2 months and has increased in freq and intensity over the last 2 weeks. Reports nausea at times but denies vomiting or diarrhea. She reports that her pain worsened this afternoon after eating some chicken drumettes from a local restaurant. States has seen her PCP Dr. Sesay regarding the pain and has an order for an ultrasound. She reports that [facility] does not have opening for ultrasound until April. She denies fever or chills, cough, congestion, chest pain or dyspnea. PMH ESRD on hemodialysis MWF, DM-insulin dependent, HTN, hypothyroidism, colon cancer.      Review of patient's allergies indicates:  No Known Allergies  Past Medical History:   Diagnosis Date    Chronic kidney disease     Diabetes mellitus     Hypertension      Past Surgical History:   Procedure Laterality Date    ABDOMINAL SURGERY      colon cancer    HYSTERECTOMY       No family history on file.  Social History     Tobacco Use    Smoking status: Never    Smokeless tobacco: Never   Substance Use Topics    Alcohol use: Never    Drug use: Never     Review of Systems    Physical Exam     Initial Vitals [11/11/24 1953]   BP Pulse Resp Temp SpO2   (!) 152/85 99 18 100.2 °F (37.9 °C) 99 %      MAP       --         Physical Exam    Medical Screening Exam   See Full Note    ED Course   Procedures  Labs Reviewed   COMPREHENSIVE METABOLIC PANEL - Abnormal       Result Value    Sodium 129 (*)     Potassium 4.6      Chloride 89 (*)     CO2 34 (*)     Anion Gap 11      Glucose 710 (*)     BUN 23 (*)     Creatinine 7.31 (*)     BUN/Creatinine Ratio 3 (*)     Calcium 8.6      Total Protein 6.9      Albumin 2.8 (*)     Globulin 4.1 (*)     A/G Ratio 0.7      Bilirubin, Total 0.3      Alk Phos 249 (*)     ALT 26      AST 17      eGFR 6 (*)    URINALYSIS,  REFLEX TO URINE CULTURE - Abnormal    Color, UA Yellow      Clarity, UA Clear      pH, UA 7.0      Leukocytes, UA Negative      Nitrites, UA Negative      Protein, UA >=300 (*)     Glucose, UA >=1000 (*)     Ketones, UA Negative      Urobilinogen, UA 0.2      Bilirubin, UA Negative      Blood, UA Trace-Lysed (*)     Specific [place], UA 1.015     LIPASE - Abnormal    Lipase 99 (*)    CBC WITH DIFFERENTIAL - Abnormal    WBC 11.95 (*)     RBC 3.19 (*)     Hemoglobin 9.4 (*)     Hematocrit 30.5 (*)     MCV 95.6      MCH 29.5      MCHC 30.8 (*)     RDW 16.3 (*)     Platelet Count 177      MPV 11.8      Neutrophils % 79.5 (*)     Lymphocytes % 13.0 (*)     Monocytes % 5.7      Eosinophils % 0.8 (*)     Basophils % 0.3      Immature Granulocytes % 0.7 (*)     nRBC, Auto 0.7 (*)     Neutrophils, Abs 9.50 (*)     Lymphocytes, Absolute 1.55      Monocytes, Absolute 0.68      Eosinophils, Absolute 0.10      Basophils, Absolute 0.04      Immature Granulocytes, Absolute 0.08 (*)     nRBC, Absolute 0.08 (*)     Diff Type Auto     URINALYSIS, MICROSCOPIC - Abnormal    WBC, UA 0-5      RBC, UA 0-3      Bacteria, UA Many (*)     Yeast, UA Moderate (*)     Squamous Epithelial Cells, UA Many (*)    POCT GLUCOSE MONITORING CONTINUOUS - Abnormal    POC Glucose 406 (*)    AMYLASE - Normal    Amylase 108     CULTURE, URINE   CBC W/ AUTO DIFFERENTIAL    Narrative:     The following orders were created for panel order CBC auto differential.  Procedure                               Abnormality         Status                     ---------                               -----------         ------                     CBC with Differential[[id number]]       Abnormal            Final result                 Please view results for these tests on the individual orders.   ACETONE    Acetone Negative            Imaging Results              CT Abdomen Pelvis  Without Contrast (Final result)  Result time 11/11/24 21:58:08      Final result by Priscila  MD Isaak (11/11/24 21:58:08)                   Impression:      No acute intra-abdominal or pelvic process.    Hiatal hernia.  No evidence of obstruction.    Probable cholelithiasis.    Additional findings as above.      Electronically signed by: Isaak Francois MD  Date:    11/11/2024  Time:    21:58               Narrative:    EXAMINATION:  CT ABDOMEN PELVIS WITHOUT CONTRAST    CLINICAL HISTORY:  RUQ pain;    TECHNIQUE:  Axial CT scan of the abdomen and pelvis was obtained from the level of the hemidiaphragms to the pubic symphysis without intravenous contrast. Coronal and sagittal reformats were obtained. The study was performed using a renal stone protocol.  Therefore, no intravenous or oral IV contrast was administered. .    COMPARISON:  01/11/2022.    FINDINGS:  There no pleural effusions.  There is no evidence of a pneumothorax.  No airspace opacity is present.  No pulmonary nodules identified.    The heart is unremarkable.  There is normal tapering of the abdominal aorta.  There advanced calcifications of the aorta and its branch vessels.    There is no evidence of lymphadenopathy in the abdomen or pelvis.    There is a small hiatal hernia.  There is also some herniation of intra-abdominal fat contents through the hernia defect.  The distal portion of the stomach is unremarkable.  The duodenum is within normal limits.  The small bowel loops are unremarkable.  The appendix is within normal limits.  There are postoperative changes of the mid transverse colon.  There is no CT evidence of bowel obstruction.    The liver is unremarkable.  There is high attenuation material within the gallbladder.  The biliary tree is within normal limits.  The spleen is unremarkable.  The pancreas is within normal limits.  The adrenal glands are unremarkable.    Both kidneys are slightly atrophic.  There is mild bilateral perinephric stranding.  The ureters and urinary bladder are unremarkable.  The patient is status post  hysterectomy.    There is no evidence of free fluid in the abdomen or pelvis.  There is no evidence of free air.  There is no evidence of pneumatosis.  No portal venous air is identified.    The psoas margins are unremarkable.  There is an umbilical hernia containing omental fat.  The remainder of the abdominal wall is unremarkable.  The osseous structures unremarkable.                                    X-Rays:   Independently Interpreted Readings:   Abdomen: Small hiatal hernia, mild mucosal thickening of GE junction, no bowel obstruction, Sludge in the gallbladder, No biliary dilation, liver is unremarkable, pancreas is unremarkable, Kidneys are atrophic, no ureteral stone or hydronephrosis.       Medications   insulin regular injection 10 Units 0.1 mL (10 Units Subcutaneous Given 11/11/24 2111)     Medical Decision Making  Presented with c/o RUQ pain that has ongoing for the last 2 months and has increased in freq and intensity over the last 2 weeks. Reports nausea at times but denies vomiting or diarrhea. States has seen her PCP Dr. Sesay regarding the pain and has an order for an ultrasound. She reports that [facility] does not have opening for ultrasound until April. She denies fever or chills, cough, congestion, chest pain or dyspnea. PMH ESRD on hemodialysis MWF, DM-insulin dependent, HTN, hypothyroidism, colon cancer.    Amount and/or Complexity of Data Reviewed  Labs: ordered. Decision-making details documented in ED Course.     Details: UA shows many bacteria, no leukocytes or nitrites or WBCs on micro.     Lipase 99, amylase 108, ALT 26, AST 17, BUN 23, creatinine 7.31, WBC 43677 with H&H 9.4 and 30.5, plt 290777, glucose 710.    Repeat FSBS 557  Radiology: ordered.     Details: CT abd pelvis without contrast shows small hiatal hernia with mucosal thickening of GE junction, sludge in gall bladder with no biliary ductal dilitation.    Risk  OTC drugs.  Risk Details: Reviewed pt's instructions from PCP.  Dr. Sesay has ordered a breast ultrasound with axilla. Pt states that she thought it was her breast causing her pain since the pain is under her breast.    Discussed diagnostic findings with pt. US abdomen ordered for OP. Amb referral to [facility] (on call at pt request). PT is stable. HR 87, RR 18, /81, O2 sat 99% on RA. Glucose down to 407 after Regular insulin 10 units SC given. Pt did not take evening dose of insulin today since she took her morning dose around 1430 after her dialysis treatment. Instructed pt on home care, diet, follow-up and return precautions. Discharged home with detailed instructions.               ED Course as of 11/11/24 2324 Mon Nov 11, 2024 2038 Protein, UA(!): >=300 [DS]   2038 Glucose, UA(!): >=1000 [DS]   2038 Blood, UA(!): Trace-Lysed [DS]   2038 Leukocyte Esterase, UA: Negative [DS]   2038 NITRITE UA: Negative [DS]   2038 WBC, UA: 0-5 [DS]   2039 Lipase(!): 99 [DS]   2039 Amylase Level: 108 [DS]   2039 WBC(!): 11.95 [DS]   2039 Hemoglobin(!): 9.4 [DS]   2039 Hematocrit(!): 30.5 [DS]   2039 Platelet Count: 177 [DS]   2039 Neutrophils Relative(!): 79.5 [DS]   2046 Sodium(!): 129 [DS]   2046 Chloride(!): 89 [DS]   2046 Glucose(!!): 710 [DS]   2046 BUN(!): 23 [DS]   2046 Creatinine(!): 7.31 [DS]   2046 ALT: 26 [DS]   2046 AST: 17 [DS]   2114 Acetone, Bld: Negative [DS]      ED Course User Index  [DS] Maribell Julien NP                           Clinical Impression:   Final diagnoses:  [R10.11] RUQ pain (Primary)  [K80.20] Calculus of gallbladder without cholecystitis without obstruction  [E11.65, Z79.4] Type 2 diabetes mellitus with hyperglycemia, with long-term current use of insulin  [N18.6, Z99.2] ESRD (end stage renal disease) on dialysis        ED Disposition Condition    Discharge Stable          ED Prescriptions    None       Follow-up Information       Follow up With Specialties Details Why Contact Info    Nathan Abarca MD General Surgery    [address] [facility] [place] [address]  [phone]               Maribell Julien NP  11/11/24 7042

## 2024-11-13 LAB — GLUCOSE SERPL-MCNC: 557 MG/DL (ref 70–105)

## 2025-03-07 ENCOUNTER — HOSPITAL ENCOUNTER (OUTPATIENT)
Dept: RADIOLOGY | Facility: HOSPITAL | Age: 50
Discharge: HOME | End: 2025-03-07
Attending: FAMILY MEDICINE
Payer: COMMERCIAL

## 2025-03-07 DIAGNOSIS — Z12.31 OTHER SCREENING MAMMOGRAM: ICD-10-CM

## 2025-03-07 PROCEDURE — 77063 BREAST TOMOSYNTHESIS BI: CPT | Mod: TC

## 2025-03-07 PROCEDURE — 77067 SCR MAMMO BI INCL CAD: CPT | Mod: 26,,, | Performed by: RADIOLOGY

## 2025-03-07 PROCEDURE — 77063 BREAST TOMOSYNTHESIS BI: CPT | Mod: 26,,, | Performed by: RADIOLOGY
